# Patient Record
Sex: MALE | Race: WHITE | Employment: OTHER | URBAN - METROPOLITAN AREA
[De-identification: names, ages, dates, MRNs, and addresses within clinical notes are randomized per-mention and may not be internally consistent; named-entity substitution may affect disease eponyms.]

---

## 2017-01-06 ENCOUNTER — HOSPITAL ENCOUNTER (EMERGENCY)
Facility: HOSPITAL | Age: 67
Discharge: HOME | End: 2017-01-06
Attending: EMERGENCY MEDICINE
Payer: MEDICARE

## 2017-01-06 ENCOUNTER — APPOINTMENT (OUTPATIENT)
Dept: GENERAL RADIOLOGY | Facility: HOSPITAL | Age: 67
End: 2017-01-06
Attending: EMERGENCY MEDICINE
Payer: MEDICARE

## 2017-01-06 VITALS
BODY MASS INDEX: 28.63 KG/M2 | SYSTOLIC BLOOD PRESSURE: 155 MMHG | HEART RATE: 100 BPM | OXYGEN SATURATION: 98 % | RESPIRATION RATE: 20 BRPM | HEIGHT: 70 IN | DIASTOLIC BLOOD PRESSURE: 99 MMHG | WEIGHT: 200 LBS | TEMPERATURE: 98 F

## 2017-01-06 DIAGNOSIS — R00.2 PALPITATIONS: Primary | ICD-10-CM

## 2017-01-06 LAB
ALBUMIN SERPL-MCNC: 3.8 G/DL (ref 3.5–4.8)
ALP LIVER SERPL-CCNC: 91 U/L (ref 45–117)
ALT SERPL-CCNC: 30 U/L (ref 17–63)
ATRIAL RATE: 99 BPM
BASOPHILS # BLD AUTO: 0.1 X10(3) UL (ref 0–0.1)
BASOPHILS NFR BLD AUTO: 1.1 %
BILIRUB SERPL-MCNC: 0.8 MG/DL (ref 0.1–2)
BUN BLD-MCNC: 14 MG/DL (ref 8–20)
CALCIUM BLD-MCNC: 8.6 MG/DL (ref 8.3–10.3)
CHLORIDE: 104 MMOL/L (ref 101–111)
CO2: 25 MMOL/L (ref 22–32)
CREAT BLD-MCNC: 1.33 MG/DL (ref 0.7–1.3)
D-DIMER: 0.29 UG/ML FEU (ref 0–0.49)
EOSINOPHIL # BLD AUTO: 0.19 X10(3) UL (ref 0–0.3)
EOSINOPHIL NFR BLD AUTO: 2.1 %
ERYTHROCYTE [DISTWIDTH] IN BLOOD BY AUTOMATED COUNT: 13.3 % (ref 11.5–16)
FREE T4: 1.1 NG/DL (ref 0.9–1.8)
GLUCOSE BLD-MCNC: 106 MG/DL (ref 70–99)
HCT VFR BLD AUTO: 47.1 % (ref 37–53)
HGB BLD-MCNC: 16.1 G/DL (ref 13–17)
IMMATURE GRANULOCYTE COUNT: 0.02 X10(3) UL (ref 0–1)
IMMATURE GRANULOCYTE RATIO %: 0.2 %
LYMPHOCYTES # BLD AUTO: 1.93 X10(3) UL (ref 0.9–4)
LYMPHOCYTES NFR BLD AUTO: 21.6 %
M PROTEIN MFR SERPL ELPH: 8 G/DL (ref 6.1–8.3)
MCH RBC QN AUTO: 30.9 PG (ref 27–33.2)
MCHC RBC AUTO-ENTMCNC: 34.2 G/DL (ref 31–37)
MCV RBC AUTO: 90.4 FL (ref 80–99)
MONOCYTES # BLD AUTO: 0.66 X10(3) UL (ref 0.1–0.6)
MONOCYTES NFR BLD AUTO: 7.4 %
NEUTROPHIL ABS PRELIM: 6.04 X10 (3) UL (ref 1.3–6.7)
NEUTROPHILS # BLD AUTO: 6.04 X10(3) UL (ref 1.3–6.7)
NEUTROPHILS NFR BLD AUTO: 67.6 %
P AXIS: 59 DEGREES
P-R INTERVAL: 116 MS
PLATELET # BLD AUTO: 256 10(3)UL (ref 150–450)
Q-T INTERVAL: 346 MS
QRS DURATION: 84 MS
QTC CALCULATION (BEZET): 444 MS
R AXIS: 43 DEGREES
RBC # BLD AUTO: 5.21 X10(6)UL (ref 3.8–5.8)
RED CELL DISTRIBUTION WIDTH-SD: 43.9 FL (ref 35.1–46.3)
SODIUM SERPL-SCNC: 136 MMOL/L (ref 136–144)
T AXIS: 62 DEGREES
TROPONIN: <0.046 NG/ML (ref ?–0.05)
VENTRICULAR RATE: 99 BPM
WBC # BLD AUTO: 8.9 X10(3) UL (ref 4–13)

## 2017-01-06 PROCEDURE — 85025 COMPLETE CBC W/AUTO DIFF WBC: CPT | Performed by: EMERGENCY MEDICINE

## 2017-01-06 PROCEDURE — 85378 FIBRIN DEGRADE SEMIQUANT: CPT | Performed by: EMERGENCY MEDICINE

## 2017-01-06 PROCEDURE — 93005 ELECTROCARDIOGRAM TRACING: CPT

## 2017-01-06 PROCEDURE — 80053 COMPREHEN METABOLIC PANEL: CPT | Performed by: EMERGENCY MEDICINE

## 2017-01-06 PROCEDURE — 96360 HYDRATION IV INFUSION INIT: CPT

## 2017-01-06 PROCEDURE — 96361 HYDRATE IV INFUSION ADD-ON: CPT

## 2017-01-06 PROCEDURE — 93010 ELECTROCARDIOGRAM REPORT: CPT

## 2017-01-06 PROCEDURE — 84439 ASSAY OF FREE THYROXINE: CPT | Performed by: EMERGENCY MEDICINE

## 2017-01-06 PROCEDURE — 71010 XR CHEST AP PORTABLE  (CPT=71010): CPT

## 2017-01-06 PROCEDURE — 99285 EMERGENCY DEPT VISIT HI MDM: CPT

## 2017-01-06 PROCEDURE — 84484 ASSAY OF TROPONIN QUANT: CPT | Performed by: EMERGENCY MEDICINE

## 2017-01-06 RX ORDER — ASPIRIN 81 MG/1
324 TABLET, CHEWABLE ORAL ONCE
Status: COMPLETED | OUTPATIENT
Start: 2017-01-06 | End: 2017-01-06

## 2017-01-06 RX ORDER — SODIUM CHLORIDE 9 MG/ML
INJECTION, SOLUTION INTRAVENOUS ONCE
Status: COMPLETED | OUTPATIENT
Start: 2017-01-06 | End: 2017-01-06

## 2017-01-06 NOTE — ED INITIAL ASSESSMENT (HPI)
Pt c/o heart racing starting last night, and c/o left upper chest pain, denies TANJA, dizziness, or recent travel.

## 2017-09-26 PROCEDURE — 84153 ASSAY OF PSA TOTAL: CPT | Performed by: INTERNAL MEDICINE

## 2018-10-02 PROCEDURE — 84153 ASSAY OF PSA TOTAL: CPT | Performed by: INTERNAL MEDICINE

## 2018-10-09 PROBLEM — R73.01 IFG (IMPAIRED FASTING GLUCOSE): Status: ACTIVE | Noted: 2018-10-09

## 2019-11-26 NOTE — ED PROVIDER NOTES
Patient Seen in: [facility] Emergency Department    History   Patient presents with:  Chest Pain Angina (cardiovascular)    Stated Complaint: Palpitations     HPI    62-year-old white male who presents the emergency room today for complaint of palpi [facility]       Medications :   Nortriptyline HCl 10 MG Oral Cap,  Take 10 mg by mouth nightly as needed.  1 to 2 tabs nightly PRN per Sudha Green MD   triamcinolone acetonide 0.1 % External Cream,  Apply twice daily for up to 2 weeks as needed   Zol signs show he is mildly tachycardic otherwise vital signs are stable he is afebrile    Head is normocephalic atraumatic conjunctiva is clear. Sclerae anicteric. Neck is supple. Lungs are clear to auscultation bilaterally.   Heart is regular rate and rh intervals and axes as noted on EKG Report. Rate: 99 bpm  Rhythm: Sinus Rhythm  Reading: Normal sinus rhythm without acute ischemic changes noted. No ectopy is noted. Agree with computer report for rate axis and intervals.           Chest x-ray reveals: Bill For Surgical Tray: no

## 2022-12-05 RX ORDER — ZOLPIDEM TARTRATE 5 MG/1
TABLET ORAL
Qty: 90 TABLET | Refills: 0 | Status: SHIPPED | OUTPATIENT
Start: 2022-12-05

## 2023-02-14 ENCOUNTER — APPOINTMENT (OUTPATIENT)
Dept: GENERAL RADIOLOGY | Facility: HOSPITAL | Age: 73
End: 2023-02-14
Attending: INTERNAL MEDICINE
Payer: MEDICARE

## 2023-02-14 ENCOUNTER — APPOINTMENT (OUTPATIENT)
Dept: CT IMAGING | Facility: HOSPITAL | Age: 73
End: 2023-02-14
Attending: EMERGENCY MEDICINE
Payer: MEDICARE

## 2023-02-14 ENCOUNTER — APPOINTMENT (OUTPATIENT)
Dept: GENERAL RADIOLOGY | Facility: HOSPITAL | Age: 73
End: 2023-02-14
Attending: EMERGENCY MEDICINE
Payer: MEDICARE

## 2023-02-14 ENCOUNTER — HOSPITAL ENCOUNTER (INPATIENT)
Facility: HOSPITAL | Age: 73
LOS: 1 days | Discharge: HOME | End: 2023-02-16
Attending: EMERGENCY MEDICINE | Admitting: INTERNAL MEDICINE
Payer: MEDICARE

## 2023-02-14 ENCOUNTER — APPOINTMENT (OUTPATIENT)
Dept: GENERAL RADIOLOGY | Facility: HOSPITAL | Age: 73
End: 2023-02-14
Attending: STUDENT IN AN ORGANIZED HEALTH CARE EDUCATION/TRAINING PROGRAM
Payer: MEDICARE

## 2023-02-14 DIAGNOSIS — E86.0 DEHYDRATION: ICD-10-CM

## 2023-02-14 DIAGNOSIS — S01.81XA FACIAL LACERATION, INITIAL ENCOUNTER: ICD-10-CM

## 2023-02-14 DIAGNOSIS — R55 SYNCOPE, VASOVAGAL: ICD-10-CM

## 2023-02-14 DIAGNOSIS — T07.XXXA ABRASIONS OF MULTIPLE SITES: ICD-10-CM

## 2023-02-14 DIAGNOSIS — K56.609 SMALL BOWEL OBSTRUCTION (HCC): Primary | ICD-10-CM

## 2023-02-14 PROBLEM — N17.9 ACUTE KIDNEY INJURY: Status: ACTIVE | Noted: 2023-02-14

## 2023-02-14 PROBLEM — D72.829 LEUKOCYTOSIS: Status: ACTIVE | Noted: 2023-02-14

## 2023-02-14 PROBLEM — R73.9 HYPERGLYCEMIA: Status: ACTIVE | Noted: 2023-02-14

## 2023-02-14 PROBLEM — E87.1 HYPONATREMIA: Status: ACTIVE | Noted: 2023-02-14

## 2023-02-14 PROBLEM — N17.9 ACUTE KIDNEY INJURY (HCC): Status: ACTIVE | Noted: 2023-02-14

## 2023-02-14 LAB
ALBUMIN SERPL-MCNC: 3.8 G/DL (ref 3.4–5)
ALBUMIN/GLOB SERPL: 0.8 {RATIO} (ref 1–2)
ALP LIVER SERPL-CCNC: 88 U/L
ALT SERPL-CCNC: 38 U/L
ANION GAP SERPL CALC-SCNC: 8 MMOL/L (ref 0–18)
AST SERPL-CCNC: 33 U/L (ref 15–37)
ATRIAL RATE: 114 BPM
BASOPHILS # BLD AUTO: 0.02 X10(3) UL (ref 0–0.2)
BASOPHILS NFR BLD AUTO: 0.1 %
BILIRUB SERPL-MCNC: 0.8 MG/DL (ref 0.1–2)
BUN BLD-MCNC: 29 MG/DL (ref 7–18)
CALCIUM BLD-MCNC: 9.4 MG/DL (ref 8.5–10.1)
CHLORIDE SERPL-SCNC: 100 MMOL/L (ref 98–112)
CO2 SERPL-SCNC: 23 MMOL/L (ref 21–32)
CREAT BLD-MCNC: 1.98 MG/DL
EOSINOPHIL # BLD AUTO: 0 X10(3) UL (ref 0–0.7)
EOSINOPHIL NFR BLD AUTO: 0 %
ERYTHROCYTE [DISTWIDTH] IN BLOOD BY AUTOMATED COUNT: 13.3 %
GFR SERPLBLD BASED ON 1.73 SQ M-ARVRAT: 35 ML/MIN/1.73M2 (ref 60–?)
GLOBULIN PLAS-MCNC: 4.6 G/DL (ref 2.8–4.4)
GLUCOSE BLD-MCNC: 179 MG/DL (ref 70–99)
GLUCOSE BLD-MCNC: 180 MG/DL (ref 70–99)
HCT VFR BLD AUTO: 52 %
HGB BLD-MCNC: 18.1 G/DL
IMM GRANULOCYTES # BLD AUTO: 0.06 X10(3) UL (ref 0–1)
IMM GRANULOCYTES NFR BLD: 0.4 %
LYMPHOCYTES # BLD AUTO: 1.26 X10(3) UL (ref 1–4)
LYMPHOCYTES NFR BLD AUTO: 8.7 %
MCH RBC QN AUTO: 30.7 PG (ref 26–34)
MCHC RBC AUTO-ENTMCNC: 34.8 G/DL (ref 31–37)
MCV RBC AUTO: 88.1 FL
MONOCYTES # BLD AUTO: 1.11 X10(3) UL (ref 0.1–1)
MONOCYTES NFR BLD AUTO: 7.7 %
NEUTROPHILS # BLD AUTO: 12.02 X10 (3) UL (ref 1.5–7.7)
NEUTROPHILS # BLD AUTO: 12.02 X10(3) UL (ref 1.5–7.7)
NEUTROPHILS NFR BLD AUTO: 83.1 %
OSMOLALITY SERPL CALC.SUM OF ELEC: 282 MOSM/KG (ref 275–295)
P AXIS: 50 DEGREES
P-R INTERVAL: 118 MS
PLATELET # BLD AUTO: 271 10(3)UL (ref 150–450)
POTASSIUM SERPL-SCNC: 3.7 MMOL/L (ref 3.5–5.1)
PROT SERPL-MCNC: 8.4 G/DL (ref 6.4–8.2)
Q-T INTERVAL: 302 MS
QRS DURATION: 74 MS
QTC CALCULATION (BEZET): 416 MS
R AXIS: 71 DEGREES
RBC # BLD AUTO: 5.9 X10(6)UL
SARS-COV-2 RNA RESP QL NAA+PROBE: NOT DETECTED
SODIUM SERPL-SCNC: 131 MMOL/L (ref 136–145)
T AXIS: 75 DEGREES
VENTRICULAR RATE: 114 BPM
WBC # BLD AUTO: 14.5 X10(3) UL (ref 4–11)

## 2023-02-14 PROCEDURE — 71045 X-RAY EXAM CHEST 1 VIEW: CPT | Performed by: EMERGENCY MEDICINE

## 2023-02-14 PROCEDURE — 12011 RPR F/E/E/N/L/M 2.5 CM/<: CPT

## 2023-02-14 PROCEDURE — 74021 RADEX ABDOMEN 3+ VIEWS: CPT | Performed by: STUDENT IN AN ORGANIZED HEALTH CARE EDUCATION/TRAINING PROGRAM

## 2023-02-14 PROCEDURE — 93010 ELECTROCARDIOGRAM REPORT: CPT

## 2023-02-14 PROCEDURE — 99285 EMERGENCY DEPT VISIT HI MDM: CPT

## 2023-02-14 PROCEDURE — 96360 HYDRATION IV INFUSION INIT: CPT

## 2023-02-14 PROCEDURE — 93005 ELECTROCARDIOGRAM TRACING: CPT

## 2023-02-14 PROCEDURE — 96361 HYDRATE IV INFUSION ADD-ON: CPT

## 2023-02-14 PROCEDURE — 80053 COMPREHEN METABOLIC PANEL: CPT | Performed by: EMERGENCY MEDICINE

## 2023-02-14 PROCEDURE — 0HQ1XZZ REPAIR FACE SKIN, EXTERNAL APPROACH: ICD-10-PCS | Performed by: EMERGENCY MEDICINE

## 2023-02-14 PROCEDURE — 82962 GLUCOSE BLOOD TEST: CPT

## 2023-02-14 PROCEDURE — 74177 CT ABD & PELVIS W/CONTRAST: CPT | Performed by: EMERGENCY MEDICINE

## 2023-02-14 PROCEDURE — 0D9670Z DRAINAGE OF STOMACH WITH DRAINAGE DEVICE, VIA NATURAL OR ARTIFICIAL OPENING: ICD-10-PCS | Performed by: EMERGENCY MEDICINE

## 2023-02-14 PROCEDURE — 70450 CT HEAD/BRAIN W/O DYE: CPT | Performed by: EMERGENCY MEDICINE

## 2023-02-14 PROCEDURE — 85025 COMPLETE CBC W/AUTO DIFF WBC: CPT | Performed by: EMERGENCY MEDICINE

## 2023-02-14 RX ORDER — ONDANSETRON 2 MG/ML
4 INJECTION INTRAMUSCULAR; INTRAVENOUS EVERY 4 HOURS PRN
Status: ACTIVE | OUTPATIENT
Start: 2023-02-14 | End: 2023-02-14

## 2023-02-14 RX ORDER — HYDROCODONE BITARTRATE AND ACETAMINOPHEN 5; 325 MG/1; MG/1
2 TABLET ORAL EVERY 4 HOURS PRN
Status: DISCONTINUED | OUTPATIENT
Start: 2023-02-14 | End: 2023-02-16

## 2023-02-14 RX ORDER — HEPARIN SODIUM 5000 [USP'U]/ML
5000 INJECTION, SOLUTION INTRAVENOUS; SUBCUTANEOUS EVERY 8 HOURS SCHEDULED
Status: DISCONTINUED | OUTPATIENT
Start: 2023-02-14 | End: 2023-02-16

## 2023-02-14 RX ORDER — MORPHINE SULFATE 4 MG/ML
4 INJECTION, SOLUTION INTRAMUSCULAR; INTRAVENOUS EVERY 2 HOUR PRN
Status: DISCONTINUED | OUTPATIENT
Start: 2023-02-14 | End: 2023-02-16

## 2023-02-14 RX ORDER — ONDANSETRON 2 MG/ML
4 INJECTION INTRAMUSCULAR; INTRAVENOUS EVERY 6 HOURS PRN
Status: DISCONTINUED | OUTPATIENT
Start: 2023-02-14 | End: 2023-02-16

## 2023-02-14 RX ORDER — SODIUM CHLORIDE 9 MG/ML
INJECTION, SOLUTION INTRAVENOUS CONTINUOUS
Status: DISCONTINUED | OUTPATIENT
Start: 2023-02-14 | End: 2023-02-16

## 2023-02-14 RX ORDER — ACETAMINOPHEN 500 MG
500 TABLET ORAL EVERY 4 HOURS PRN
Status: DISCONTINUED | OUTPATIENT
Start: 2023-02-14 | End: 2023-02-16

## 2023-02-14 RX ORDER — NORTRIPTYLINE HYDROCHLORIDE 10 MG/1
10 CAPSULE ORAL 2 TIMES DAILY
COMMUNITY

## 2023-02-14 RX ORDER — SODIUM PHOSPHATE, DIBASIC AND SODIUM PHOSPHATE, MONOBASIC 7; 19 G/133ML; G/133ML
1 ENEMA RECTAL ONCE AS NEEDED
Status: DISCONTINUED | OUTPATIENT
Start: 2023-02-14 | End: 2023-02-16

## 2023-02-14 RX ORDER — POTASSIUM CHLORIDE 14.9 MG/ML
20 INJECTION INTRAVENOUS ONCE
Status: COMPLETED | OUTPATIENT
Start: 2023-02-14 | End: 2023-02-14

## 2023-02-14 RX ORDER — HYDROCODONE BITARTRATE AND ACETAMINOPHEN 5; 325 MG/1; MG/1
1 TABLET ORAL EVERY 4 HOURS PRN
Status: DISCONTINUED | OUTPATIENT
Start: 2023-02-14 | End: 2023-02-16

## 2023-02-14 RX ORDER — SODIUM CHLORIDE 9 MG/ML
INJECTION, SOLUTION INTRAVENOUS CONTINUOUS
Status: ACTIVE | OUTPATIENT
Start: 2023-02-14 | End: 2023-02-14

## 2023-02-14 RX ORDER — MELATONIN
3 NIGHTLY PRN
Status: DISCONTINUED | OUTPATIENT
Start: 2023-02-14 | End: 2023-02-16

## 2023-02-14 RX ORDER — MORPHINE SULFATE 2 MG/ML
2 INJECTION, SOLUTION INTRAMUSCULAR; INTRAVENOUS EVERY 2 HOUR PRN
Status: DISCONTINUED | OUTPATIENT
Start: 2023-02-14 | End: 2023-02-16

## 2023-02-14 RX ORDER — ACETAMINOPHEN 325 MG/1
650 TABLET ORAL EVERY 4 HOURS PRN
Status: DISCONTINUED | OUTPATIENT
Start: 2023-02-14 | End: 2023-02-16

## 2023-02-14 RX ORDER — METOCLOPRAMIDE HYDROCHLORIDE 5 MG/ML
5 INJECTION INTRAMUSCULAR; INTRAVENOUS EVERY 8 HOURS PRN
Status: DISCONTINUED | OUTPATIENT
Start: 2023-02-14 | End: 2023-02-16

## 2023-02-14 RX ORDER — SENNOSIDES 8.6 MG
17.2 TABLET ORAL NIGHTLY PRN
Status: DISCONTINUED | OUTPATIENT
Start: 2023-02-14 | End: 2023-02-16

## 2023-02-14 RX ORDER — POLYETHYLENE GLYCOL 3350 17 G/17G
17 POWDER, FOR SOLUTION ORAL DAILY PRN
Status: DISCONTINUED | OUTPATIENT
Start: 2023-02-14 | End: 2023-02-16

## 2023-02-14 RX ORDER — MORPHINE SULFATE 2 MG/ML
1 INJECTION, SOLUTION INTRAMUSCULAR; INTRAVENOUS EVERY 2 HOUR PRN
Status: DISCONTINUED | OUTPATIENT
Start: 2023-02-14 | End: 2023-02-16

## 2023-02-14 RX ORDER — HYDROMORPHONE HYDROCHLORIDE 1 MG/ML
0.5 INJECTION, SOLUTION INTRAMUSCULAR; INTRAVENOUS; SUBCUTANEOUS EVERY 30 MIN PRN
Status: ACTIVE | OUTPATIENT
Start: 2023-02-14 | End: 2023-02-14

## 2023-02-14 RX ORDER — BISACODYL 10 MG
10 SUPPOSITORY, RECTAL RECTAL
Status: DISCONTINUED | OUTPATIENT
Start: 2023-02-14 | End: 2023-02-16

## 2023-02-14 NOTE — PLAN OF CARE
Problem: PAIN - ADULT  Goal: Verbalizes/displays adequate comfort level or patient's stated pain goal  Description: INTERVENTIONS:  - Encourage pt to monitor pain and request assistance  - Assess pain using appropriate pain scale  - Administer analgesics based on type and severity of pain and evaluate response  - Implement non-pharmacological measures as appropriate and evaluate response  - Consider cultural and social influences on pain and pain management  - Manage/alleviate anxiety  - Utilize distraction and/or relaxation techniques  - Monitor for opioid side effects  - Notify MD/LIP if interventions unsuccessful or patient reports new pain  - Anticipate increased pain with activity and pre-medicate as appropriate  Outcome: Progressing     Problem: RISK FOR INFECTION - ADULT  Goal: Absence of fever/infection during anticipated neutropenic period  Description: INTERVENTIONS  - Monitor WBC  - Administer growth factors as ordered  - Implement neutropenic guidelines  Outcome: Progressing     Problem: SAFETY ADULT - FALL  Goal: Free from fall injury  Description: INTERVENTIONS:  - Assess pt frequently for physical needs  - Identify cognitive and physical deficits and behaviors that affect risk of falls.   - [place] fall precautions as indicated by assessment.  - Educate pt/family on patient safety including physical limitations  - Instruct pt to call for assistance with activity based on assessment  - Modify environment to reduce risk of injury  - Provide assistive devices as appropriate  - Consider OT/PT consult to assist with strengthening/mobility  - Encourage toileting schedule  Outcome: Progressing     Problem: DISCHARGE PLANNING  Goal: Discharge to home or other facility with appropriate resources  Description: INTERVENTIONS:  - Identify barriers to discharge w/pt and caregiver  - Include patient/family/discharge partner in discharge planning  - Arrange for needed discharge resources and transportation as appropriate  - Identify discharge learning needs (meds, wound care, etc)  - Arrange for interpreters to assist at discharge as needed  - Consider post-discharge preferences of patient/family/discharge partner  - Complete POLST form as appropriate  - Assess patient's ability to be responsible for managing their own health  - Refer to Case Management Department for coordinating discharge planning if the patient needs post-hospital services based on physician/LIP order or complex needs related to functional status, cognitive ability or social support system  Outcome: Progressing

## 2023-02-14 NOTE — ED QUICK NOTES
[facility] Assumed care of pt at this time. Pt is a&ox3. Pt resting on stretcher with family at bedside. Pt denies further needs at this time.

## 2023-02-14 NOTE — ED INITIAL ASSESSMENT (HPI)
Pt c/o of N/V/D since Sunday. Pt denies abdominal pain, CP, or fevers. Pt states that he was in the shower this morning and had a syncopal episode. + head injury. + LOC. Denies blood thinners. Pt has abrasions to forehead and nose. Pt denies pain.

## 2023-02-14 NOTE — ED QUICK NOTES
Orders for admission, patient is aware of plan and ready to go upstairs. Any questions, please call ED RN Carmen at extension [phone].      Patient Covid vaccination status: Fully vaccinated     COVID Test Ordered in ED: Rapid SARS-CoV-2 by PCR    COVID Suspicion at Admission: Low clinical suspicion for COVID    Running Infusions:  None    Mental Status/LOC at time of transport: a&ox3    Other pertinent information: 16 fr NG tube R nare  CIWA score: N/A   NIH score:  N/A

## 2023-02-15 ENCOUNTER — APPOINTMENT (OUTPATIENT)
Dept: GENERAL RADIOLOGY | Facility: HOSPITAL | Age: 73
End: 2023-02-15
Attending: STUDENT IN AN ORGANIZED HEALTH CARE EDUCATION/TRAINING PROGRAM
Payer: MEDICARE

## 2023-02-15 LAB
ANION GAP SERPL CALC-SCNC: 6 MMOL/L (ref 0–18)
BASOPHILS # BLD AUTO: 0.05 X10(3) UL (ref 0–0.2)
BASOPHILS NFR BLD AUTO: 0.5 %
BUN BLD-MCNC: 26 MG/DL (ref 7–18)
CALCIUM BLD-MCNC: 8.5 MG/DL (ref 8.5–10.1)
CHLORIDE SERPL-SCNC: 106 MMOL/L (ref 98–112)
CO2 SERPL-SCNC: 24 MMOL/L (ref 21–32)
CREAT BLD-MCNC: 1.17 MG/DL
EOSINOPHIL # BLD AUTO: 0.08 X10(3) UL (ref 0–0.7)
EOSINOPHIL NFR BLD AUTO: 0.8 %
ERYTHROCYTE [DISTWIDTH] IN BLOOD BY AUTOMATED COUNT: 13.3 %
GFR SERPLBLD BASED ON 1.73 SQ M-ARVRAT: 67 ML/MIN/1.73M2 (ref 60–?)
GLUCOSE BLD-MCNC: 100 MG/DL (ref 70–99)
HCT VFR BLD AUTO: 45.8 %
HGB BLD-MCNC: 15.8 G/DL
IMM GRANULOCYTES # BLD AUTO: 0.05 X10(3) UL (ref 0–1)
IMM GRANULOCYTES NFR BLD: 0.5 %
LYMPHOCYTES # BLD AUTO: 2 X10(3) UL (ref 1–4)
LYMPHOCYTES NFR BLD AUTO: 20.3 %
MCH RBC QN AUTO: 30.9 PG (ref 26–34)
MCHC RBC AUTO-ENTMCNC: 34.5 G/DL (ref 31–37)
MCV RBC AUTO: 89.6 FL
MONOCYTES # BLD AUTO: 1.1 X10(3) UL (ref 0.1–1)
MONOCYTES NFR BLD AUTO: 11.2 %
NEUTROPHILS # BLD AUTO: 6.56 X10 (3) UL (ref 1.5–7.7)
NEUTROPHILS # BLD AUTO: 6.56 X10(3) UL (ref 1.5–7.7)
NEUTROPHILS NFR BLD AUTO: 66.7 %
OSMOLALITY SERPL CALC.SUM OF ELEC: 287 MOSM/KG (ref 275–295)
PLATELET # BLD AUTO: 227 10(3)UL (ref 150–450)
POTASSIUM SERPL-SCNC: 3.6 MMOL/L (ref 3.5–5.1)
POTASSIUM SERPL-SCNC: 3.6 MMOL/L (ref 3.5–5.1)
RBC # BLD AUTO: 5.11 X10(6)UL
SODIUM SERPL-SCNC: 136 MMOL/L (ref 136–145)
WBC # BLD AUTO: 9.8 X10(3) UL (ref 4–11)

## 2023-02-15 PROCEDURE — 84132 ASSAY OF SERUM POTASSIUM: CPT | Performed by: INTERNAL MEDICINE

## 2023-02-15 PROCEDURE — C9113 INJ PANTOPRAZOLE SODIUM, VIA: HCPCS | Performed by: STUDENT IN AN ORGANIZED HEALTH CARE EDUCATION/TRAINING PROGRAM

## 2023-02-15 PROCEDURE — 85025 COMPLETE CBC W/AUTO DIFF WBC: CPT | Performed by: INTERNAL MEDICINE

## 2023-02-15 PROCEDURE — 74019 RADEX ABDOMEN 2 VIEWS: CPT | Performed by: STUDENT IN AN ORGANIZED HEALTH CARE EDUCATION/TRAINING PROGRAM

## 2023-02-15 PROCEDURE — 80048 BASIC METABOLIC PNL TOTAL CA: CPT | Performed by: INTERNAL MEDICINE

## 2023-02-15 NOTE — RESPIRATORY THERAPY NOTE
Patient has a history of NANCY and is suppose to wear CPAP at night but is refusing here because of his NG tube. NANCY protocol in place.

## 2023-02-15 NOTE — PLAN OF CARE
Pt asleep in bed most of shift. Declined CPAP r/t NGT (L nare, 55cm); pt NPO. IVF running, no c/o pain.     Problem: Patient/Family Goals  Goal: Patient/Family Long Term Goal  Description: Patient's Long Term Goal: discharge    Interventions:  - bowel function returned  - ADAT  - See additional Care Plan goals for specific interventions  Outcome: Progressing  Goal: Patient/Family Short Term Goal  Description: Patient's Short Term Goal: discharge    Interventions:   - NGT removed  - ADAT  - See additional Care Plan goals for specific interventions  Outcome: Progressing

## 2023-02-15 NOTE — PLAN OF CARE
A&Ox4. VSS. RA. . Denies chest pain and SOB. Telemetry: NSR. GI: Abdomen soft, nondistended. Bowel sounds are active. Reports passing gas. Denies nausea at this time. : Voids. Denies pain at this time. Up with standby assist.  Drains: NG tube to low intermittent suction putting out green colored drainage to NG tube cannister. Diet: Strict NPO. IVF running per order. All appropriate safety measures in place. All questions and concerns addressed. Will continue to monitor.      Problem: Patient/Family Goals  Goal: Patient/Family Long Term Goal  Description: Patient's Long Term Goal: discharge    Interventions:  - bowel function returned  - ADAT  - See additional Care Plan goals for specific interventions  Outcome: Progressing  Goal: Patient/Family Short Term Goal  Description: Patient's Short Term Goal: discharge    Interventions:   - NGT removed  - ADAT  - See additional Care Plan goals for specific interventions  Outcome: Progressing     Problem: PAIN - ADULT  Goal: Verbalizes/displays adequate comfort level or patient's stated pain goal  Description: INTERVENTIONS:  - Encourage pt to monitor pain and request assistance  - Assess pain using appropriate pain scale  - Administer analgesics based on type and severity of pain and evaluate response  - Implement non-pharmacological measures as appropriate and evaluate response  - Consider cultural and social influences on pain and pain management  - Manage/alleviate anxiety  - Utilize distraction and/or relaxation techniques  - Monitor for opioid side effects  - Notify MD/LIP if interventions unsuccessful or patient reports new pain  - Anticipate increased pain with activity and pre-medicate as appropriate  Outcome: Progressing     Problem: RISK FOR INFECTION - ADULT  Goal: Absence of fever/infection during anticipated neutropenic period  Description: INTERVENTIONS  - Monitor WBC  - Administer growth factors as ordered  - Implement neutropenic guidelines  Outcome: Progressing     Problem: SAFETY ADULT - FALL  Goal: Free from fall injury  Description: INTERVENTIONS:  - Assess pt frequently for physical needs  - Identify cognitive and physical deficits and behaviors that affect risk of falls.   - [place] fall precautions as indicated by assessment.  - Educate pt/family on patient safety including physical limitations  - Instruct pt to call for assistance with activity based on assessment  - Modify environment to reduce risk of injury  - Provide assistive devices as appropriate  - Consider OT/PT consult to assist with strengthening/mobility  - Encourage toileting schedule  Outcome: Progressing     Problem: DISCHARGE PLANNING  Goal: Discharge to home or other facility with appropriate resources  Description: INTERVENTIONS:  - Identify barriers to discharge w/pt and caregiver  - Include patient/family/discharge partner in discharge planning  - Arrange for needed discharge resources and transportation as appropriate  - Identify discharge learning needs (meds, wound care, etc)  - Arrange for interpreters to assist at discharge as needed  - Consider post-discharge preferences of patient/family/discharge partner  - Complete POLST form as appropriate  - Assess patient's ability to be responsible for managing their own health  - Refer to Case Management Department for coordinating discharge planning if the patient needs post-hospital services based on physician/LIP order or complex needs related to functional status, cognitive ability or social support system  Outcome: Progressing

## 2023-02-16 VITALS
HEIGHT: 70.87 IN | RESPIRATION RATE: 18 BRPM | DIASTOLIC BLOOD PRESSURE: 67 MMHG | BODY MASS INDEX: 28.7 KG/M2 | TEMPERATURE: 98 F | SYSTOLIC BLOOD PRESSURE: 136 MMHG | HEART RATE: 94 BPM | WEIGHT: 205 LBS | OXYGEN SATURATION: 95 %

## 2023-02-16 LAB — POTASSIUM SERPL-SCNC: 3.6 MMOL/L (ref 3.5–5.1)

## 2023-02-16 PROCEDURE — 84132 ASSAY OF SERUM POTASSIUM: CPT | Performed by: HOSPITALIST

## 2023-02-16 NOTE — PROGRESS NOTES
Patient is alert and oriented x3. Up ad henrietta in hallway. Up to chair for meals. Voiding freely. Tolerating diet; denies nausea or vomiting. + gas. Pain controlled. Patient assessed and ready for DC home. All instructions given to patient for home. All questions answered to patients level of satisfaction. PIV removed and intact. Ambulated with RN to [employer] where wife is waiting to take patient home.

## 2023-02-16 NOTE — PLAN OF CARE
A&Ox4. VSS. RA. . Denies chest pain and SOB. Telemetry: NSR. GI: Abdomen soft, nondistended. Bowel sounds are active. Reports passing \"plenty of gas\". Denies nausea at this time. : Voids. Denies pain at this time. Up with standby assist.  Diet: Strict Tolerating low fiber diet. All appropriate safety measures in place. All questions and concerns addressed. Will continue to monitor.      Problem: Patient/Family Goals  Goal: Patient/Family Long Term Goal  Description: Patient's Long Term Goal: discharge    Interventions:  - bowel function returned  - ADAT  - See additional Care Plan goals for specific interventions  Outcome: Progressing  Goal: Patient/Family Short Term Goal  Description: Patient's Short Term Goal: discharge    Interventions:   - NGT removed  - ADAT  - See additional Care Plan goals for specific interventions  Outcome: Progressing     Problem: PAIN - ADULT  Goal: Verbalizes/displays adequate comfort level or patient's stated pain goal  Description: INTERVENTIONS:  - Encourage pt to monitor pain and request assistance  - Assess pain using appropriate pain scale  - Administer analgesics based on type and severity of pain and evaluate response  - Implement non-pharmacological measures as appropriate and evaluate response  - Consider cultural and social influences on pain and pain management  - Manage/alleviate anxiety  - Utilize distraction and/or relaxation techniques  - Monitor for opioid side effects  - Notify MD/LIP if interventions unsuccessful or patient reports new pain  - Anticipate increased pain with activity and pre-medicate as appropriate  Outcome: Progressing     Problem: RISK FOR INFECTION - ADULT  Goal: Absence of fever/infection during anticipated neutropenic period  Description: INTERVENTIONS  - Monitor WBC  - Administer growth factors as ordered  - Implement neutropenic guidelines  Outcome: Progressing     Problem: SAFETY ADULT - FALL  Goal: Free from fall injury  Description: INTERVENTIONS:  - Assess pt frequently for physical needs  - Identify cognitive and physical deficits and behaviors that affect risk of falls.   - [place] fall precautions as indicated by assessment.  - Educate pt/family on patient safety including physical limitations  - Instruct pt to call for assistance with activity based on assessment  - Modify environment to reduce risk of injury  - Provide assistive devices as appropriate  - Consider OT/PT consult to assist with strengthening/mobility  - Encourage toileting schedule  Outcome: Progressing     Problem: DISCHARGE PLANNING  Goal: Discharge to home or other facility with appropriate resources  Description: INTERVENTIONS:  - Identify barriers to discharge w/pt and caregiver  - Include patient/family/discharge partner in discharge planning  - Arrange for needed discharge resources and transportation as appropriate  - Identify discharge learning needs (meds, wound care, etc)  - Arrange for interpreters to assist at discharge as needed  - Consider post-discharge preferences of patient/family/discharge partner  - Complete POLST form as appropriate  - Assess patient's ability to be responsible for managing their own health  - Refer to Case Management Department for coordinating discharge planning if the patient needs post-hospital services based on physician/LIP order or complex needs related to functional status, cognitive ability or social support system  Outcome: Progressing

## 2023-02-16 NOTE — PLAN OF CARE
Pt resting comfortably overnight. Pt was to be discharged today, but had positive orthostatics; recheck at night was negative. NGT was removed. Tolerating soft diet. Likely discharge today. Problem: Patient/Family Goals  Goal: Patient/Family Long Term Goal  Description: Patient's Long Term Goal: discharge    Interventions:  - bowel function returned  - ADAT  - See additional Care Plan goals for specific interventions  Outcome: Progressing  Goal: Patient/Family Short Term Goal  Description: Patient's Short Term Goal: discharge    Interventions:   - NGT removed  - ADAT  - See additional Care Plan goals for specific interventions  Outcome: Progressing     Problem: SAFETY ADULT - FALL  Goal: Free from fall injury  Description: INTERVENTIONS:  - Assess pt frequently for physical needs  - Identify cognitive and physical deficits and behaviors that affect risk of falls.   - [place] fall precautions as indicated by assessment.  - Educate pt/family on patient safety including physical limitations  - Instruct pt to call for assistance with activity based on assessment  - Modify environment to reduce risk of injury  - Provide assistive devices as appropriate  - Consider OT/PT consult to assist with strengthening/mobility  - Encourage toileting schedule  Outcome: Progressing

## 2023-03-08 ENCOUNTER — OFFICE VISIT (OUTPATIENT)
Facility: CLINIC | Age: 73
End: 2023-03-08
Payer: MEDICARE

## 2023-03-08 VITALS
SYSTOLIC BLOOD PRESSURE: 136 MMHG | DIASTOLIC BLOOD PRESSURE: 82 MMHG | BODY MASS INDEX: 28.7 KG/M2 | WEIGHT: 205 LBS | HEART RATE: 88 BPM | RESPIRATION RATE: 16 BRPM | OXYGEN SATURATION: 97 % | HEIGHT: 70.87 IN

## 2023-03-08 DIAGNOSIS — G47.9 SLEEP DIFFICULTIES: ICD-10-CM

## 2023-03-08 DIAGNOSIS — F45.8 PSYCHOGENIC COUGH: ICD-10-CM

## 2023-03-08 DIAGNOSIS — G47.33 OSA (OBSTRUCTIVE SLEEP APNEA): Primary | ICD-10-CM

## 2023-03-08 PROCEDURE — 99214 OFFICE O/P EST MOD 30 MIN: CPT | Performed by: OTHER

## 2023-03-08 PROCEDURE — 1111F DSCHRG MED/CURRENT MED MERGE: CPT | Performed by: OTHER

## 2023-03-08 RX ORDER — ZOLPIDEM TARTRATE 6.25 MG/1
12.5 TABLET, FILM COATED, EXTENDED RELEASE ORAL NIGHTLY PRN
Qty: 15 TABLET | Refills: 0 | Status: SHIPPED
Start: 2023-03-08

## 2023-03-08 NOTE — PATIENT INSTRUCTIONS
Please be advised:   Do not drive while sleepy   Take CPAP/BiPAP machine to any procedure that requires sedation     When should I clean my machine & supplies? Clean mask cushions or nasal pillow, headgear, tubing, and humidifier chamber with mild soap (Jory) and water   If water chamber has hard water buildup (white crust), soak in warm water & vinegar mix 50/50. Rinse and hang dry     DAILY   Wipe mask cushions or nasal pillow   Empty & rinse water chamber- refill with distilled water     WEEKLY   Clean mask cushions or nasal pillow, headgear, tubing, and humidifier chamber with mild soap (Jory) and water   If water chamber has hard water buildup (white crust), soak in warm water & vinegar mix 50/50. Rinse and hang dry     When should supplies be replaced? Contact your home care company for replacement supplies, or if your machine is malfunctioning   *Below is a general guideline of what we recommend. Replacement of supplies differs depending on your insurance company*   MONTHLY: Replace filter and mask cushion   6 MONTHS: Replace headgear and tubing     Travel Tips   Keep CPAP/BiPAP in original bag when traveling, and place luggage tag on bag   Most airlines consider CPAP/BiPAP to be a medical device, therefore it is a free carry-on item   If unable to get distilled water, bottled water is safe while traveling.  DO NOT use tap water   When traveling outside the [place] only a power adapter is necessary (CPAP can operate without a converter), bring an extension cord   Consider purchasing or renting a travel CPAP (not covered by insurance)     Dry Mouth/Nose   Turn up the humidity on your machine (select \"Options\" from the home screen)   Place a cool mist humidifier at your bedside   Over-the-counter remedies: Biotene, XyliMelts, NasoGel     Air Leak   Try adjusting your mask/headgear while laying in sleeping position vs. sitting up   Wash and dry your face prior to putting your mask on   If applicable: shave facial hair at night (or before wearing CPAP)   Purchase \"RemZzzs\" (through home care co., cpap."[employer] or remzzzs. com)   100% cotton knit barrier that goes between your mask cushion and your skin   Replace your mask cushion (at least once per month) and/or headgear (every 3-6 months)     Nasal Congestion   CPAP therapy can cause nasal passages to dry out, & mucous membranes try to protect the nasal passages by producing excess mucous, so congestion results. Over-the counter remedies: Flonase, Nasacort, Sinus Rinses (Neti-Pot), DO NOT USE Afrin   Try a mask that goes over the nose and mouth     Skin Irritation   Clean supplies regularly (Citrus II Mask Wipes, Control III disinfectant solution)   Try over the counter creams such as hydrocortisone 1% (apply in the morning after showering)   Your headgear may be too tight, replace supplies so you don't need to adjust so tightly   Try RemZzzs (100% cotton knit barrier that goes between your mask cushion and your skin)     Gas/Bloating   Try a different sleeping position to keep air out of the stomach. Lay on the left side or rotate to the right side. Incline with pillows or lay flat. Over-the-counter remedies: Simethicone        How to avoid insomnia  Wake up at the same time each day. Maintaining a regular sleep schedule is important to good sleep. Eliminate alcohol and stimulants like nicotine and caffeine. The effects of caffeine can last for several hours, perhaps up to 24 hours, so the chances of it affecting sleep are significant. Caffeine may not only cause difficulty initiating sleep, but may also cause frequent awakenings. Alcohol may have a sedative effect for the first few hours following consumption, but it can then lead to frequent arousals and a non-restful night's sleep. Medications that act as stimulants, such as decongestants  can also disrupt your sleep and should be avoided.   Use of technology such as computers, media and smart phones prior to bedtime should be avoided. Do not look at the clock. Clock watching has been associated with increased anxiety during sleep and worsening of insomnia. The bright lights from modern digital clocks have been associated with poor sleep. Clocks should be turned away from you and if the light is too bright, they should be covered. Eliminate naps. While napping seems like a proper way to catch up on missed sleep, it is not always so. It is important to establish and maintain a regular sleep pattern and train oneself to associate sleep with cues like darkness and a consistent bedtime. Napping can affect the quality of nighttime sleep. If you must nap, do not nap past 1pm and no longer than 45min-1 hour. Exercise regularly. Regular exercise can improve sleep quality and duration. However, exercising immediately before bedtime can have a stimulant effect on the body and should be avoided. Try to finish exercising at least three hours before you plan to retire for the night. Limit activities in bed. The bed is for sleeping and having sex and that's it. Do not use the computer or smart phone, watch TV, catch up on work or listen to music while in bed. All these activities can increase alertness and make it difficult to fall asleep. Do not eat or drink right before going to bed. Eating a late dinner or snacking before going to bed can activate the digestive system and keep you up. Drinking a lot of fluids prior to bed can overwhelm the bladder, requiring frequent visits to the bathroom that disturb your sleep. Make your sleeping environment comfortable. Temperature, lighting, and noise should be controlled to make the bedroom conducive to falling (and staying) asleep. Ideally your bedroom temperature should be 66-68 degrees. Rooms that are warm will make it more difficult to fall asleep. Do not allow your pet to sleep on your bed or in the bedroom.   If your bed partner is disruptive due to snoring or constant movement, consider sleeping alone in a separate bedroom. Get all your worrying over with before you go to bed. If you find you lay in bed thinking about tomorrow, consider setting aside a period of time -- perhaps after dinner -- to review the day and to make plans for the next day. The goal is to avoid doing these things while trying to fall asleep. It is also useful to make a list of, say, work-related tasks for the next day before leaving work. That, at least, eliminates one set of concerns. Reduce stress. There are a number of relaxation therapies and stress reduction methods you may want to try to relax the mind and the body before going to bed. Examples include progressive muscle relaxation (perhaps with audio tapes), deep breathing techniques, imagery, and meditation.

## 2023-03-17 ENCOUNTER — PATIENT MESSAGE (OUTPATIENT)
Facility: CLINIC | Age: 73
End: 2023-03-17

## 2023-03-17 DIAGNOSIS — G47.33 OSA (OBSTRUCTIVE SLEEP APNEA): Primary | ICD-10-CM

## 2023-03-20 NOTE — TELEPHONE ENCOUNTER
Fatmata Logan DO 3/20/2023 3:17 PM CDT    Ok we can send it for zolpidem 10mg, thanks, rb  ----- Message -----  From: RT Pavan  Sent: 3/20/2023 2:24 PM CDT  To: Osei Foy DO  Subject: FW: Medicine       ----- Message -----  From: Keisha Alba  Sent: 3/17/2023 2:12 PM CDT  To: Osei Foy DO; [facility]  Subject: FW: Medicine      RB - please review. ----- Message -----  From: Yue Bennett  Sent: 3/17/2023 2:08 PM CDT  To: [facility]  Subject: Medicine     Hi Dr. Ansley Rust,   The 6.25 mg zolpidem isn't working. I tried 10mg zolpidem and able to sleep 6 hours. I need a prescription as I'm out .      Jessica Zimmer

## 2023-03-21 RX ORDER — ZOLPIDEM TARTRATE 10 MG/1
10 TABLET ORAL NIGHTLY
Qty: 30 TABLET | Refills: 5 | Status: SHIPPED | OUTPATIENT
Start: 2023-03-21

## 2023-09-28 DIAGNOSIS — G47.33 OSA (OBSTRUCTIVE SLEEP APNEA): ICD-10-CM

## 2023-09-29 DIAGNOSIS — G47.33 OSA (OBSTRUCTIVE SLEEP APNEA): ICD-10-CM

## 2023-10-02 RX ORDER — ZOLPIDEM TARTRATE 10 MG/1
10 TABLET ORAL NIGHTLY
Qty: 30 TABLET | Refills: 0 | OUTPATIENT
Start: 2023-10-02

## 2023-10-02 RX ORDER — ZOLPIDEM TARTRATE 10 MG/1
10 TABLET ORAL NIGHTLY
Qty: 30 TABLET | Refills: 5 | Status: SHIPPED | OUTPATIENT
Start: 2023-10-02

## 2024-03-08 NOTE — PROGRESS NOTES
This is a 73 year old male who presents with the following symptoms, risk factors, behaviors or other items associated with sleep problems.    Sleep Apnea:   age 65 or older; current CPAP use  Insomnia:  waking too early  Restless Leg:  tingling or crawly feeling in the legs  Parasomnias:   no symptoms of parasomnias  Daytime Problems:  fatigue    The patient's [place] Sleepiness score is 1/24.

## 2024-03-11 ENCOUNTER — TELEPHONE (OUTPATIENT)
Facility: CLINIC | Age: 74
End: 2024-03-11

## 2024-03-11 ENCOUNTER — OFFICE VISIT (OUTPATIENT)
Facility: CLINIC | Age: 74
End: 2024-03-11
Payer: MEDICARE

## 2024-03-11 VITALS
BODY MASS INDEX: 28.84 KG/M2 | WEIGHT: 206 LBS | DIASTOLIC BLOOD PRESSURE: 84 MMHG | RESPIRATION RATE: 20 BRPM | SYSTOLIC BLOOD PRESSURE: 136 MMHG | HEART RATE: 84 BPM | OXYGEN SATURATION: 97 % | HEIGHT: 70.87 IN

## 2024-03-11 DIAGNOSIS — G47.10 HYPERSOMNIA: ICD-10-CM

## 2024-03-11 DIAGNOSIS — G47.33 OSA (OBSTRUCTIVE SLEEP APNEA): Primary | ICD-10-CM

## 2024-03-11 DIAGNOSIS — F51.04 PSYCHOPHYSIOLOGICAL INSOMNIA: ICD-10-CM

## 2024-03-11 DIAGNOSIS — G47.9 SLEEP DIFFICULTIES: ICD-10-CM

## 2024-03-11 PROCEDURE — 99214 OFFICE O/P EST MOD 30 MIN: CPT | Performed by: OTHER

## 2024-03-11 RX ORDER — ZOLPIDEM TARTRATE 10 MG/1
10 TABLET ORAL NIGHTLY PRN
Qty: 90 TABLET | Refills: 1 | Status: SHIPPED | OUTPATIENT
Start: 2024-03-11

## 2024-03-11 NOTE — TELEPHONE ENCOUNTER
Cpap machine reading error message:   Motor life exceeded.  Pt requesting replacement cpap order.       Pt c/o chest pain. States she had MI 12/2/22 but left AMA, was told by Dr. Cotter to come to ED to go to cath lab.

## 2024-03-11 NOTE — PROGRESS NOTES
[facility]  SLEEP PROGRESS NOTE        HPI:   This is a 73 year old male coming in for   Chief Complaint   Patient presents with    Obstructive Sleep Apnea (NANCY)     DME: [employer]  Under the nose mask - medium  Device is 7yrs old / need refill of zolpidem       HPI:     Usage 12/12/2023 - 03/10/2024  Usage days 90/90 days (100%)  >= 4 hours 88 days (98%)  < 4 hours 2 days (2%)  Usage hours 646 hours 57 minutes  Average usage (total days) 7 hours 11 minutes  Average usage (days used) 7 hours 11 minutes  Median usage (days used) 7 hours 20 minutes  AirSense 10 AutoSet  Serial number [id number]  Mode AutoSet  Min Pressure 7 cmH2O  Max Pressure 12 cmH2O  EPR Fulltime  EPR level 1  Therapy  Pressure - cmH2O Median: 10.5 95th percentile: 11.8 Maximum: 11.9  Leaks - L/min Median: 0.0 95th percentile: 5.2 Maximum: 17.6  Events per hour AI: 1.1 HI: 1.5 AHI: 2.6  Apnea Index Central: 0.5 Obstructive: 0.6 Unknown: 0.0  RERA Index 0.2  Cheyne-Santillan respiration (average duration per night) 0 minutes (0%)  DME [facility]    He takes zolpidem 10mg nightly  Goes to bed at 11, takes meds at 1030  SL 10min and wakes at 2am, gets back to sleep most nights, sometimes can't, OOB 630am  Retired   Volunteer as needed for conventions      Feels he benefits from using cpap  He feels refreshed even with short sleep nights    He does nod off during the day on occasion        Patient: Sleep review of systems today: see form.      Pt  PCP:  Robin Ivan MD  No referring provider defined for this encounter.           No data to display                    Past Medical History:   Diagnosis Date    Allergic rhinitis     HYPERLIPIDEMIA     Insomnia 08/11/2010    Other abnormal blood chemistry 08/11/2010    Other and unspecified hyperlipidemia     SLEEP APNEA 07/01/2009    using c-pap    Unspecified sleep apnea      Past Surgical History:   Procedure Laterality Date    COLONOSCOPY N/A 12/17/2016    Procedure: COLONOSCOPY, POSSIBLE BIOPSY,  POSSIBLE POLYPECTOMY 56794;  Surgeon: Alli De La Rosa MD;  Location: [facility]    HERNIA SURGERY      LAPAROSCOPIC REPAIR OF INITIAL N/A 08/28/2015    Procedure: LAPAROSCOPIC INGUINAL HERNIORRAPHY;  Surgeon: Shay Taylor MD;  Location: [facility]    OTHER SURGICAL HISTORY  Rupture    REPAIR UMBILICAL PATRICK,5+Y/O,REDUC N/A 08/28/2015    Procedure: UMBILICAL HERNIORRAPHY;  Surgeon: Shay Taylor MD;  Location: [facility]    TONSILLECTOMY       Social History:  Social History     Social History Narrative    Not on file     Social History     Socioeconomic History    Marital status: Single   Occupational History    Occupation: Retired--technical writing/manager   Tobacco Use    Smoking status: Never    Smokeless tobacco: Never   Vaping Use    Vaping Use: Never used   Substance and Sexual Activity    Alcohol use: No    Drug use: No    Sexual activity: Yes     Partners: Female   Other Topics Concern     Service No    Blood Transfusions No    Sleep Concern Yes     Comment: NANCY with CPAP    Exercise Yes    Seat Belt Yes     Family History:  Family History   Problem Relation Age of Onset    Cancer Father     Hypertension Mother     Heart Disorder Mother      Allergies:  No Known Allergies  Current Meds:  Current Outpatient Medications   Medication Sig Dispense Refill    zolpidem 10 MG Oral Tab Take 1 tablet (10 mg total) by mouth nightly. 30 tablet 5    nortriptyline 10 MG Oral Cap Take 1 capsule (10 mg total) by mouth 2 (two) times daily.      simvastatin 20 MG Oral Tab TAKE 1 TABLET BY MOUTH EVERY NIGHT AT BEDTIME 90 tablet 3    Pantoprazole Sodium 20 MG Oral Tab EC Take 1 tablet (20 mg total) by mouth daily as needed. 30 tablet 12    Montelukast Sodium (SINGULAIR) 10 MG Oral Tab Take 1 tablet (10 mg total) by mouth nightly. 90 tablet 3    Zolpidem Tartrate ER 6.25 MG Oral Tab CR Take 2 tablets (12.5 mg total) by mouth nightly as needed for Sleep. (Patient not  taking: Reported on 3/11/2024) 15 tablet 0    ZOLPIDEM 5 MG Oral Tab TAKE 1 TABLET BY MOUTH EVERY NIGHT (Patient not taking: Reported on 3/11/2024) 90 tablet 0    clotrimazole-betamethasone 1-0.05 % External Cream Apply to AA twice daily as needed (Patient not taking: Reported on 3/11/2024) 30 g prn      Counseling given: Not Answered         Problem List:  Patient Active Problem List   Diagnosis    Hyperlipidemia, mixed / Rx: Simvastatin / LDL Goal < 160 mg/dl    Sleep difficulties / Rx: Zolpidem    Blood Glucose Elevated / HbA1C ~ 5.4%    NANCY (Obstructive Sleep Apnea) / Rx CPAP 11 cm H2O - [facility] [Dr. Quinones]    LPRD (laryngopharyngeal reflux disease) --> cough / Rx Pantoprazole    Vasomotor rhinitis --> cough / Rx: fluticasone & singulair    Neurogenic cough / Rx Nortriptyline PM - A. Karol    Colon cancer screening / Colonosopy 12/17/16 / recheck 10 years - [facility] Waldrop    IFG (impaired fasting glucose)    Hyponatremia    Leukocytosis    Acute kidney injury (HCC)    Hyperglycemia    Small bowel obstruction (HCC)    Syncope, vasovagal    Dehydration    Facial laceration, initial encounter    Abrasions of multiple sites    Hypersomnia       REVIEW OF SYSTEMS:   Review of Systems    EXAM:   /84 (BP Location: Right arm, Patient Position: Sitting, Cuff Size: adult)   Pulse 84   Resp 20   Ht 5' 10.87\" (1.8 m)   Wt 206 lb (93.4 kg)   SpO2 97%   BMI 28.84 kg/m²  Estimated body mass index is 28.84 kg/m² as calculated from the following:    Height as of this encounter: 5' 10.87\" (1.8 m).    Weight as of this encounter: 206 lb (93.4 kg).   Neck in inches:      Wt Readings from Last 6 Encounters:   03/11/24 206 lb (93.4 kg)   03/08/23 205 lb (93 kg)   02/14/23 205 lb (93 kg)   12/03/21 210 lb (95.3 kg)   10/14/21 202 lb (91.6 kg)   04/13/21 214 lb (97.1 kg)     BP Readings from Last 3 Encounters:   03/11/24 136/84   03/08/23 136/82   02/16/23 136/67     Pulse Readings from Last 3 Encounters:   03/11/24 84    03/08/23 88   02/16/23 94     SpO2 Readings from Last 3 Encounters:   03/11/24 97%   03/08/23 97%   02/16/23 95%      Ideal body weight: 75 kg (165 lb 5.6 oz)  Adjusted ideal body weight: 82.4 kg (181 lb 9.7 oz)    Vital signs reviewed.  Physical Exam    ASSESSMENT AND PLAN:   1. NANCY (Obstructive Sleep Apnea)  Patient is using and benefiting from regular cpap use.  Patient was instructed to clean equipment on a weekly basis.  Patient was instructed to keep up to date with supplies.  Patient was informed to avoid drowsy driving, or using heavy machinery.  Patient was instructed to followup on a annual basis.   2. Sleep difficulties / Rx: Zolpidem  Needs refill(90 day supply)  3. Hypersomnia  Feels great  There are no Patient Instructions on file for this visit.    Independent interpretation of Sleep Download as defined above.  Continue with Rx management of Sleep apnea with PAP therapy.    COMPLIANCE is required by insurance for 4 hours a night most nights of the week.    Advised if still with sleep apnea and not using CPAP has a 7 fold increase in risk of heart attack, stroke, abnormal heart rhythm  and death,  increased risk of driving accidents.     Advised to refrain from driving when sleepy.      Recommend weight loss, and maintain and optimal BMI with Exercise 30 minutes most days to target heart rate .     Advised patient to change filters,masks,hoses  and tubes and equiptment on a  regular schedule.    Filters and seals shall be changed every 1 month,  Hoses every 3 months,   CPAP mask and humidifier chamber changed every 6 month  with the durable medical equipment provider.         Meds & Refills for this Visit:  Requested Prescriptions      No prescriptions requested or ordered in this encounter       Outcome: Parent verbalizes understanding. Parent is notified to call with any questions, complications, allergies, or worsening or changing symptoms.  Parent is to call with any side effects or complications  from the treatments as a result of today.     \" This note was created utilizing Dragon speech recognition software.  Please excuse any grammatical errors. Call my office if you have any questions regarding this note. \"     Chelsi Martinez,   3/11/2024  9:32 AM

## 2024-03-12 ENCOUNTER — PATIENT MESSAGE (OUTPATIENT)
Facility: CLINIC | Age: 74
End: 2024-03-12

## 2024-03-12 NOTE — TELEPHONE ENCOUNTER
From: Lewis Booker  To: Chelsi Martinez  Sent: 3/12/2024 9:39 AM CDT  Subject: CPAP    My CPAP machine is over 7 years old.  I noticed the screen has motor life exceeded.  Do I need a new machine?  Called yesterday and left message for Blank

## 2024-05-24 NOTE — PROGRESS NOTES
[facility]  SLEEP PROGRESS NOTE        HPI:   This is a 74 year old male coming in for   Chief Complaint   Patient presents with    Obstructive Sleep Apnea (NANCY)     DME:[facility]  MASK:Nasal  DEVICE:        HPI:     This is a 74 year old male who presents with the following symptoms, risk factors, behaviors or other items associated with sleep problems.    Sleep Apnea:   age 65 or older; current CPAP use  Insomnia:  restless sleep  Restless Leg:  tingling or crawly feeling in the legs  Parasomnias:   no symptoms of parasomnias  Daytime Problems:  sleepiness    The patient's [employer] Sleepiness score is 1/24.    JhonyLewis  03/15/2024 - 2024  Patient ID: [id number]  : 1950  Age: 74 years  Gender: Male  [place]  [address]  [facility], [address]  Phone: [phone]  Fax: [phone]  Email: [email or web address]  Compliance Report  Compliance  Payor Standard  Usage 03/15/2024 - 2024  Usage days 66/70 days (94%)  >= 4 hours 66 days (94%)  < 4 hours 0 days (0%)  Usage hours 472 hours 35 minutes  Average usage (total days) 6 hours 45 minutes  Average usage (days used) 7 hours 10 minutes  Median usage (days used) 7 hours 17 minutes  Total used hours (value since last reset - 2024) 472 hours  AirSense 11 AutoSet  Serial number [id number]  Mode AutoSet  Min Pressure 7 cmH2O  Max Pressure 12 cmH2O  EPR Ramp Only  EPR level 2  Response Standard  Therapy  Pressure - cmH2O Median: 10.0 95th percentile: 11.6 Maximum: 11.8  Leaks - L/min Median: 0.0 95th percentile: 6.0 Maximum: 17.1  Events per hour AI: 0.6 HI: 1.8 AHI: 2.4  Apnea Index Central: 0.3 Obstructive: 0.2 Unknown: 0.0  RERA Index 0.2  Cheyne-Santillan respiration (average duration per night) 0 minutes (0%)    Had knee surgery may 6, he is uncomfortable and tosses and turns  Events are increased on his new cpap device    Goes to bed 11- takes ambien nightly 10mg  Wakes around 2am          Patient: Sleep review of systems  today: see form.      Pt  PCP:  Robin Ivan MD  No referring provider defined for this encounter.           No data to display                    Past Medical History:    Allergic rhinitis    HYPERLIPIDEMIA    Insomnia    Other abnormal blood chemistry    Other and unspecified hyperlipidemia    SLEEP APNEA    using c-pap    Unspecified sleep apnea     Past Surgical History:   Procedure Laterality Date    Colonoscopy N/A 12/17/2016    Procedure: COLONOSCOPY, POSSIBLE BIOPSY, POSSIBLE POLYPECTOMY 92570;  Surgeon: Alli De La Rosa MD;  Location: [facility]    Hernia surgery      Laparoscopic repair of initial N/A 08/28/2015    Procedure: LAPAROSCOPIC INGUINAL HERNIORRAPHY;  Surgeon: Shay Taylor MD;  Location: [facility]    Other surgical history  Rupture    Repair umbilical zeynep,5+y/o,reduc N/A 08/28/2015    Procedure: UMBILICAL HERNIORRAPHY;  Surgeon: Shay Taylor MD;  Location: [facility]    Tonsillectomy       Social History:  Social History     Social History Narrative    Not on file     Social History     Socioeconomic History    Marital status: Single   Occupational History    Occupation: Retired--technical writing/manager   Tobacco Use    Smoking status: Never    Smokeless tobacco: Never   Vaping Use    Vaping status: Never Used   Substance and Sexual Activity    Alcohol use: No    Drug use: No    Sexual activity: Yes     Partners: Female   Other Topics Concern     Service No    Blood Transfusions No    Sleep Concern Yes     Comment: NANCY with CPAP    Exercise Yes    Seat Belt Yes     Family History:  Family History   Problem Relation Age of Onset    Cancer Father     Hypertension Mother     Heart Disorder Mother      Allergies:  No Known Allergies  Current Meds:  Current Outpatient Medications   Medication Sig Dispense Refill    zolpidem 10 MG Oral Tab Take 1 tablet (10 mg total) by mouth nightly as needed for Sleep. 90 tablet 1    zolpidem 10 MG  Oral Tab Take 1 tablet (10 mg total) by mouth nightly. 30 tablet 5    Zolpidem Tartrate ER 6.25 MG Oral Tab CR Take 2 tablets (12.5 mg total) by mouth nightly as needed for Sleep. 15 tablet 0    nortriptyline 10 MG Oral Cap Take 1 capsule (10 mg total) by mouth 2 (two) times daily.      ZOLPIDEM 5 MG Oral Tab TAKE 1 TABLET BY MOUTH EVERY NIGHT 90 tablet 0    simvastatin 20 MG Oral Tab TAKE 1 TABLET BY MOUTH EVERY NIGHT AT BEDTIME 90 tablet 3    Pantoprazole Sodium 20 MG Oral Tab EC Take 1 tablet (20 mg total) by mouth daily as needed. 30 tablet 12    Montelukast Sodium (SINGULAIR) 10 MG Oral Tab Take 1 tablet (10 mg total) by mouth nightly. 90 tablet 3    clotrimazole-betamethasone 1-0.05 % External Cream Apply to AA twice daily as needed 30 g prn      Counseling given: Not Answered         Problem List:  Patient Active Problem List   Diagnosis    Hyperlipidemia, mixed / Rx: Simvastatin / LDL Goal < 160 mg/dl    Sleep difficulties / Rx: Zolpidem    Blood Glucose Elevated / HbA1C ~ 5.4%    NANCY (Obstructive Sleep Apnea) / Rx CPAP 11 cm H2O - [facility] [Dr. Quinones]    LPRD (laryngopharyngeal reflux disease) --> cough / Rx Pantoprazole    Vasomotor rhinitis --> cough / Rx: fluticasone & singulair    Neurogenic cough / Rx Nortriptyline PM - A. Karol    Colon cancer screening / Colonosopy 12/17/16 / recheck 10 years - H. Benjie    IFG (impaired fasting glucose)    Hyponatremia    Leukocytosis    Acute kidney injury (HCC)    Hyperglycemia    Small bowel obstruction (HCC)    Syncope, vasovagal    Dehydration    Facial laceration, initial encounter    Abrasions of multiple sites    Hypersomnia       REVIEW OF SYSTEMS:   Review of Systems    EXAM:   /72   Pulse 70   Resp 18   Ht 5' 10.87\" (1.8 m)   Wt 205 lb (93 kg)   SpO2 99%   BMI 28.70 kg/m²  Estimated body mass index is 28.7 kg/m² as calculated from the following:    Height as of this encounter: 5' 10.87\" (1.8 m).    Weight as of this encounter: 205 lb  (93 kg).   Neck in inches:      Wt Readings from Last 6 Encounters:   05/28/24 205 lb (93 kg)   03/11/24 206 lb (93.4 kg)   03/08/23 205 lb (93 kg)   02/14/23 205 lb (93 kg)   12/03/21 210 lb (95.3 kg)   10/14/21 202 lb (91.6 kg)     BP Readings from Last 3 Encounters:   05/28/24 132/72   03/11/24 136/84   03/08/23 136/82     Pulse Readings from Last 3 Encounters:   05/28/24 70   03/11/24 84   03/08/23 88     SpO2 Readings from Last 3 Encounters:   05/28/24 99%   03/11/24 97%   03/08/23 97%      Ideal body weight: 75 kg (165 lb 5.6 oz)  Adjusted ideal body weight: 82.2 kg (181 lb 3.3 oz)    Vital signs reviewed.  Physical Exam    ASSESSMENT AND PLAN:   1. Obstructive sleep apnea  Had surgery recently, tossing and turning     Patient is using and benefiting from regular cpap use.  Patient was instructed to clean equipment on a weekly basis.  Patient was instructed to keep up to date with supplies.  Patient was informed to avoid drowsy driving, or using heavy machinery.  Patient was instructed to followup on a annual basis.     2. Sleep difficulties / Rx: Zolpidem    3. NANCY (Obstructive Sleep Apnea) / Rx CPAP 11 cm H2O - [facility] [Dr.     There are no Patient Instructions on file for this visit.    Independent interpretation of Sleep Download as defined above.  Continue with Rx management of Sleep apnea with PAP therapy.    COMPLIANCE is required by insurance for 4 hours a night most nights of the week.    Advised if still with sleep apnea and not using CPAP has a 7 fold increase in risk of heart attack, stroke, abnormal heart rhythm  and death,  increased risk of driving accidents.     Advised to refrain from driving when sleepy.      Recommend weight loss, and maintain and optimal BMI with Exercise 30 minutes most days to target heart rate .     Advised patient to change filters,masks,hoses  and tubes and equiptment on a  regular schedule.    Filters and seals shall be changed every 1 month,  Hoses every 3  months,   CPAP mask and humidifier chamber changed every 6 month  with the durable medical equipment provider.         Meds & Refills for this Visit:  Requested Prescriptions      No prescriptions requested or ordered in this encounter       Outcome: Parent verbalizes understanding. Parent is notified to call with any questions, complications, allergies, or worsening or changing symptoms.  Parent is to call with any side effects or complications from the treatments as a result of today.     \" This note was created utilizing Dragon speech recognition software.  Please excuse any grammatical errors. Call my office if you have any questions regarding this note. \"     Chelsi Martinez, DO  5/28/2024  11:46 AM

## 2024-05-28 ENCOUNTER — OFFICE VISIT (OUTPATIENT)
Facility: CLINIC | Age: 74
End: 2024-05-28

## 2024-05-28 VITALS
HEART RATE: 70 BPM | BODY MASS INDEX: 28.7 KG/M2 | HEIGHT: 70.87 IN | DIASTOLIC BLOOD PRESSURE: 72 MMHG | OXYGEN SATURATION: 99 % | SYSTOLIC BLOOD PRESSURE: 132 MMHG | WEIGHT: 205 LBS | RESPIRATION RATE: 18 BRPM

## 2024-05-28 DIAGNOSIS — G47.9 SLEEP DIFFICULTIES: ICD-10-CM

## 2024-05-28 DIAGNOSIS — G47.33 OSA (OBSTRUCTIVE SLEEP APNEA): ICD-10-CM

## 2024-05-28 DIAGNOSIS — G47.33 OBSTRUCTIVE SLEEP APNEA: Primary | ICD-10-CM

## 2024-05-28 PROCEDURE — 99214 OFFICE O/P EST MOD 30 MIN: CPT | Performed by: OTHER

## 2024-06-03 ENCOUNTER — TELEPHONE (OUTPATIENT)
Facility: CLINIC | Age: 74
End: 2024-06-03

## 2024-09-17 ENCOUNTER — MED REC SCAN ONLY (OUTPATIENT)
Facility: CLINIC | Age: 74
End: 2024-09-17

## 2024-10-14 DIAGNOSIS — G47.9 SLEEP DIFFICULTIES: ICD-10-CM

## 2024-10-14 RX ORDER — ZOLPIDEM TARTRATE 10 MG/1
10 TABLET ORAL NIGHTLY PRN
Qty: 90 TABLET | Refills: 1 | Status: SHIPPED | OUTPATIENT
Start: 2024-10-14

## 2024-10-14 NOTE — TELEPHONE ENCOUNTER
Medication refill request, medication pended, please review & sign.     Medication refill:  Zolpidem     Last office visit:   05/28/2024    Next office visit:   06/2025    Last refill:   03/1/2024    [phone] (home)

## 2025-04-16 DIAGNOSIS — G47.9 SLEEP DIFFICULTIES: ICD-10-CM

## 2025-04-16 RX ORDER — ZOLPIDEM TARTRATE 10 MG/1
10 TABLET ORAL NIGHTLY PRN
Qty: 90 TABLET | Refills: 0 | Status: SHIPPED | OUTPATIENT
Start: 2025-04-16

## 2025-04-16 NOTE — TELEPHONE ENCOUNTER
Your Appointments      Monday June 02, 2025 9:00 AM  Sleep Follow Up with Chelsi Martinez DO  [facility], [facility] ([facility]) [address], [address] [address] [phone]   Sleep Patients:  Please bring your PAP device (no mask/hose) to each appointment unless instructed otherwise.

## 2025-05-29 NOTE — PROGRESS NOTES
Lewis Booker  2025 - 2025  Patient ID: [id number]  : 1950  Age: 75 years  Gender: Male  [place]  [address]  [facility] [place] [address]  Phone: [phone]  Fax: [phone]  Email: [email or web address]"  Compliance Report  Compliance  Payor Standard  Usage 2025 - 2025  Usage days 90/90 days (100%)  >= 4 hours 90 days (100%)  < 4 hours 0 days (0%)  Usage hours 649 hours 59 minutes  Average usage (total days) 7 hours 13 minutes  Average usage (days used) 7 hours 13 minutes  Median usage (days used) 7 hours 21 minutes  Total used hours (value since last reset - 2025) 3,158 hours  AirSense 11 AutoSet  Serial number [id number]  Mode AutoSet  Min Pressure 7 cmH2O  Max Pressure 12 cmH2O  EPR Ramp Only  EPR level 2  Response Standard  Therapy  Pressure - cmH2O Median: 9.1 95th percentile: 11.3 Maximum: 11.8  Leaks - L/min Median: 0.2 95th percentile: 11.4 Maximum: 21.5  Events per hour AI: 0.8 HI: 1.1 AHI: 1.9  Apnea Index Central: 0.5 Obstructive: 0.3 Unknown: 0.0  RERA Index 0.1  Cheyne-Santillan respiration (average duration per night) 0 minutes (0%)

## 2025-05-30 ENCOUNTER — TELEMEDICINE (OUTPATIENT)
Facility: CLINIC | Age: 75
End: 2025-05-30
Payer: MEDICARE

## 2025-05-30 DIAGNOSIS — G47.33 OSA (OBSTRUCTIVE SLEEP APNEA): Primary | ICD-10-CM

## 2025-05-30 DIAGNOSIS — G47.00 INSOMNIA, UNSPECIFIED TYPE: ICD-10-CM

## 2025-05-30 PROCEDURE — 99214 OFFICE O/P EST MOD 30 MIN: CPT | Performed by: PHYSICIAN ASSISTANT

## 2025-05-30 NOTE — PROGRESS NOTES
[facility]  SLEEP PROGRESS NOTE        BRODIE BOOKER is a 75 year old male who presents today for telemedicine annual         The following individual(s) verbally consented to be recorded using ambient AI listening technology and understand that they can each withdraw their consent to this listening technology at any point by asking the clinician to turn off or pause the recording:    Patient name: Brodie Booker  Additional names:  NA       History of Present Illness  Brodie Booker is a 75 year old male who presents for an annual follow-up for his CPAP therapy.    He is concerned about the CPAP rob data, which shows an average of 1.6 sleep events per hour. He experiences discomfort with the CPAP mask, particularly the hard plastic headgear, and finds it difficult to adjust the Velcro strip without causing discomfort. He has been using a nasal cushion mask for five years, which he finds to be the best fit despite the headgear discomfort. He maintains a consistent sleep pattern of five hours per night and feels restless after waking, even though the CPAP indicates seven hours of use. Sleeping pills do not alter his sleep duration.       Only sleeps 5 hours but does not feel it affects his quality of life  Can take some time to get going for the day  Previously tried zolpidem ER 6.25mg - ineffective    In bed 1030p  Wakes up 330a  Out of bed 6a  SL 5 min most days, other days up to 30 min with ambien  Nighttime awakenings none  Naps tries to avoid, sometimes dozes off by computer  Caffeine 3 cups coffee, avoids after lunch     Denies teeth grinding, leg cramps, restless legs, headaches, chest pain, thoracic back pain, bloating, drowsy driving, sleep walking, sleep talking    Tinnitus - persistent, unrelated to CPAP  Dry mouth - mild, resolves with water     DME company: [employer]   Mask type: nasal cushion    Brodie Booker  2025 - 2025  Patient ID: [id number]  : 1950  Age: 75 years  Gender:  Male  [place]  [address]  [place]  [place], [address]  Phone: [phone]  Fax: [phone]  Email: [email or web address]  Compliance Report  Compliance  Payor Standard  Usage 02/28/2025 - 05/28/2025  Usage days 90/90 days (100%)  >= 4 hours 90 days (100%)  < 4 hours 0 days (0%)  Usage hours 649 hours 59 minutes  Average usage (total days) 7 hours 13 minutes  Average usage (days used) 7 hours 13 minutes  Median usage (days used) 7 hours 21 minutes  Total used hours (value since last reset - 05/28/2025) 3,158 hours  AirSense 11 AutoSet  Serial number [id number]  Mode AutoSet  Min Pressure 7 cmH2O  Max Pressure 12 cmH2O  EPR Ramp Only  EPR level 2  Response Standard  Therapy  Pressure - cmH2O Median: 9.1 95th percentile: 11.3 Maximum: 11.8  Leaks - L/min Median: 0.2 95th percentile: 11.4 Maximum: 21.5  Events per hour AI: 0.8 HI: 1.1 AHI: 1.9  Apnea Index Central: 0.5 Obstructive: 0.3 Unknown: 0.0  RERA Index 0.1  Cheyne-Santillan respiration (average duration per night) 0 minutes (0%)       Patient: Sleep review of systems today: see form.        Pt  PCP:  Robin Ivan MD  No referring provider defined for this encounter.           No data to display                  Past Medical History[1]  Past Surgical History[2]  Social History:  Social History     Social History Narrative    Not on file     Short Social Hx on File[3]  Family History:  Family History[4]  Allergies:  Allergies[5]  Current Meds:  Current Medications[6]   Counseling given: Not Answered         Problem List:  Problem List[7]    REVIEW OF SYSTEMS:   Review of Systems  See HPI     EXAM:   There were no vitals taken for this visit. Estimated body mass index is 28.7 kg/m² as calculated from the following:    Height as of 5/28/24: 5' 10.87\" (1.8 m).    Weight as of 5/28/24: 205 lb (93 kg).   Neck in inches:      Wt Readings from Last 6 Encounters:   05/28/24 205 lb (93 kg)   03/11/24 206 lb (93.4 kg)   03/08/23 205 lb (93 kg)   02/14/23 205 lb  (93 kg)   12/03/21 210 lb (95.3 kg)   10/14/21 202 lb (91.6 kg)     BP Readings from Last 3 Encounters:   05/28/24 132/72   03/11/24 136/84   03/08/23 136/82     Pulse Readings from Last 3 Encounters:   05/28/24 70   03/11/24 84   03/08/23 88     SpO2 Readings from Last 3 Encounters:   05/28/24 99%   03/11/24 97%   03/08/23 97%      Patient weight not recorded    Vital signs reviewed.  Physical Exam  Vitals and nursing note reviewed.   Constitutional:       Appearance: Normal appearance.   HENT:      Head: Normocephalic and atraumatic.      Right Ear: External ear normal.      Left Ear: External ear normal.   Pulmonary:      Effort: Pulmonary effort is normal. No respiratory distress.   Musculoskeletal:      Cervical back: Normal range of motion and neck supple.   Neurological:      General: No focal deficit present.      Mental Status: He is alert and oriented to person, place, and time.   Psychiatric:         Attention and Perception: Attention and perception normal.         Mood and Affect: Mood and affect normal.         Speech: Speech normal.         Behavior: Behavior normal. Behavior is cooperative.         Thought Content: Thought content normal.         Cognition and Memory: Cognition and memory normal.         Judgment: Judgment normal.            Assessment & Plan  Obstructive Sleep Apnea  Obstructive sleep apnea effectively managed with CPAP. Reports discomfort with nasal cushion mask due to headgear stiffness and shifting.  - Discussed trialing different nasal cushion masks with alternative headgear for comfort.  - Considered trialing different brands of nasal cushion masks for better fit.  - Continue current pressures  - RTO in 1 year     Patient is using and benefiting from regular cpap use.  Patient was instructed to clean equipment on a weekly basis.  Patient was instructed to keep up to date with supplies.  Patient was informed to avoid drowsy driving, or using heavy machinery.  Patient was  instructed to followup on a annual basis.    Insomnia  Well controlled with zolpidem 10mg nightly. Sleeps only 5 hours a night though it does not affect his quality of life       There are no Patient Instructions on file for this visit.    Advised if still with sleep apnea and not using CPAP has a  7 fold increase in risk of heart attack, stroke, abnormal heart rhythm  and death,  increased risk of driving accidents.   Advised to refrain from driving when sleepy.    COMPLIANCE is required by insurance for 4 hours a night most nights of the week.  Recommend weight loss, and maintain and optimal BMI with Exercise 30 minutes most days of the week to target heart rate .     Advised patient to change filters,masks,hoses  and tubes and equiptment on a  regular schedule.  Filters and seals shall be changed every 1 month,  Hoses every 3 months,   CPAP mask and humidifier  chamber changed every 6 month  with the Durable medical equipment provider.       Due to covid crisis this visit was carried out electronically by  [place]    “Please note that the following visit was completed using two-way, real-time interactive audio and/or video  Or audio alone communication after patient consent.  This has been done in good alison to provide continuity of care in the best interest of the provider-patient relationship, due to the ongoing national public health crisis/national emergency and because of restrictions of visitation.  There are limitations of this visit as an adequate physical exam could not be performed.  Every conscious effort was taken to allow for sufficient and adequate time.  This billing was spent on reviewing labs,  personal device downloads, medications, radiology tests and decision making.  Appropriate medical decision-making and tests are ordered as detailed in the plan of care”      Meds & Refills for this Visit:  Requested Prescriptions      No prescriptions requested or ordered in this encounter       Outcome: Parent  verbalizes understanding. Parent is notified to call with any questions, complications, allergies, or worsening or changing symptoms.  Parent is to call with any side effects or complications from the treatments as a result of today.     This visit is conducted using Telemedicine with live, interactive video and audio.    Patient has been referred to the [facility] website at [email or web address] to review the yearly Consent to Treat document.    Patient understands and accepts financial responsibility for any deductible, co-insurance and/or co-pays associated with this service.      \" This note was created utilizing Dragon speech recognition software.  Please excuse any grammatical errors. Call my office if you have any questions regarding this note. \"     Jackie Flores PA-C           [1]   Past Medical History:   Allergic rhinitis    HYPERLIPIDEMIA    Insomnia    Other abnormal blood chemistry    Other and unspecified hyperlipidemia    SLEEP APNEA    using c-pap    Unspecified sleep apnea   [2]   Past Surgical History:  Procedure Laterality Date    Colonoscopy N/A 12/17/2016    Procedure: COLONOSCOPY, POSSIBLE BIOPSY, POSSIBLE POLYPECTOMY 82278;  Surgeon: Alli De La Rosa MD;  Location: [facility]    Hernia surgery      Laparoscopic repair of initial N/A 08/28/2015    Procedure: LAPAROSCOPIC INGUINAL HERNIORRAPHY;  Surgeon: Shay Taylor MD;  Location: [facility]    Other surgical history  Rupture    Repair umbilical zeynep,6+y/o,reduc N/A 08/28/2015    Procedure: UMBILICAL HERNIORRAPHY;  Surgeon: Shay Taylor MD;  Location: [facility]    Tonsillectomy     [3]   Social History  Socioeconomic History    Marital status: Single   Occupational History    Occupation: Retired--technical writing/manager   Tobacco Use    Smoking status: Never    Smokeless tobacco: Never   Vaping Use    Vaping status: Never Used   Substance and Sexual Activity    Alcohol use: No    Drug use: No     Sexual activity: Yes     Partners: Female   Other Topics Concern     Service No    Blood Transfusions No    Sleep Concern Yes     Comment: NANCY with CPAP    Exercise Yes    Seat Belt Yes   [4]   Family History  Problem Relation Age of Onset    Cancer Father     Hypertension Mother     Heart Disorder Mother    [5] No Known Allergies  [6]   Current Outpatient Medications   Medication Sig Dispense Refill    ZOLPIDEM 10 MG Oral Tab TAKE 1 TABLET(10 MG) BY MOUTH EVERY NIGHT AS NEEDED FOR SLEEP 90 tablet 0    zolpidem 10 MG Oral Tab Take 1 tablet (10 mg total) by mouth nightly. 30 tablet 5    nortriptyline 10 MG Oral Cap Take 1 capsule (10 mg total) by mouth 2 (two) times daily.      simvastatin 20 MG Oral Tab TAKE 1 TABLET BY MOUTH EVERY NIGHT AT BEDTIME 90 tablet 3    Pantoprazole Sodium 20 MG Oral Tab EC Take 1 tablet (20 mg total) by mouth daily as needed. 30 tablet 12    Montelukast Sodium (SINGULAIR) 10 MG Oral Tab Take 1 tablet (10 mg total) by mouth nightly. 90 tablet 3    clotrimazole-betamethasone 1-0.05 % External Cream Apply to AA twice daily as needed 30 g prn   [7]   Patient Active Problem List  Diagnosis    Hyperlipidemia, mixed / Rx: Simvastatin / LDL Goal < 160 mg/dl    Sleep difficulties / Rx: Zolpidem    Blood Glucose Elevated / HbA1C ~ 5.4%    NANCY (Obstructive Sleep Apnea) / Rx CPAP 11 cm H2O - [facility] [Dr. Quinones]    LPRD (laryngopharyngeal reflux disease) --> cough / Rx Pantoprazole    Vasomotor rhinitis --> cough / Rx: fluticasone & singulair    Neurogenic cough / Rx Nortriptyline PM - A. Karol    Colon cancer screening / Colonosopy 12/17/16 / recheck 10 years - H. Benjie    IFG (impaired fasting glucose)    Hyponatremia    Leukocytosis    Acute kidney injury    Hyperglycemia    Small bowel obstruction (HCC)    Syncope, vasovagal    Dehydration    Facial laceration, initial encounter    Abrasions of multiple sites    Hypersomnia

## 2025-07-16 DIAGNOSIS — G47.9 SLEEP DIFFICULTIES: ICD-10-CM

## 2025-07-16 RX ORDER — ZOLPIDEM TARTRATE 10 MG/1
10 TABLET ORAL NIGHTLY PRN
Qty: 90 TABLET | Refills: 0 | Status: SHIPPED | OUTPATIENT
Start: 2025-07-16

## 2025-07-16 NOTE — TELEPHONE ENCOUNTER
Medication refill request, medication pended, please review & sign.     Medication refill:  Zolpidem 10 mg    Last office visit:   [facility] - 05/30/2025  Insomnia  Well controlled with zolpidem 10mg nightly. Sleeps only 5 hours a night though it does not affect his quality of life      Next office visit:   06/06/2026    Last refill:   04/16/2025 #90, no refills    [phone] ([place])

## (undated) NOTE — ED AVS SNAPSHOT
[facility] Emergency Department    [place]  [address] [address]    Phone:  [phone]    Fax:  [facility]   MRN: [id number]    Department:  [facility] Emergency Department   Date of Visit:  1/6/2 coverage for follow-up care and referrals. [address] [facility] [place] [phone]  Pediatric [phone] Emergency Department   [phone]       To Check ER Wait Times:  TEXT 'ERwait' to 35137      Click [email or web address] will be contacted. Please make sure we have your correct phone number before you leave. After you leave, you should follow the attached instructions. I have read and understand the instructions given to me by my caregivers.         [facility] XR CHEST AP PORTABLE  (CPT=71010) (Final result) Result time:  01/06/17 08:05:30    Final result    Impression:    CONCLUSION:  Negative chest.           Dictated by: Fernanda Perez MD on 1/06/2017 at 8:05       Approved by: Fernanda Perez MD              N

## (undated) NOTE — ED AVS SNAPSHOT
[facility] Emergency Department    [place]  [address] [id number]    Phone:  [phone]    Fax:  [facility]   MRN: [id number]    Department:  [facility] Emergency Department   Date of Visit:  1/6/2 IF THERE IS ANY CHANGE OR WORSENING OF YOUR CONDITION, CALL YOUR PRIMARY CARE PHYSICIAN AT ONCE OR RETURN IMMEDIATELY TO THE EMERGENCY DEPARTMENT.     If you have been prescribed any medication(s), please fill your prescription right away and begin taking t